# Patient Record
(demographics unavailable — no encounter records)

---

## 2024-10-10 NOTE — IMAGING
[Straightening consistent with spasm] : Straightening consistent with spasm [Disc space narrowing] : Disc space narrowing [Scoliosis] : Scoliosis [de-identified] : LSPINE Palpation: Midline lumbar tenderness. No tenderness to palpation or spasm in bilateral thoracic and lumbar paraspinal musculature, no SI joint tenderness to palpation ROM: diminished all planes Strength: 5/5 bilateral hip flexors, knee extensors, ankle dorsiflexors, EHL, ankle plantarflexors Sensation: Sensation present to light touch bilateral L2-S1 distributions Provocative maneuvers: Negative bilateral straight leg raise. Tight hamstrings bilaterally  Bilateral hips- Palpation: No tenderness to palpation over greater trochanter or IT band  difficulty with tandem walk  ambulation with cane

## 2024-10-10 NOTE — HISTORY OF PRESENT ILLNESS
[Gradual] : gradual [7] : 7 [6] : 6 [Dull/Aching] : dull/aching [Sharp] : sharp [Frequent] : frequent [Leisure] : leisure [Rest] : rest [Walking] : walking [Exercising] : exercising [Not working due to injury] : Work status: not working due to injury [de-identified] : 3/19/24-62 y/o M presents for lower back pain X a few days. Denies specific injury, but states he did a lot of walking the day before onset.  Associated radiation to posterior thighs, without N/T. Has had intermittent lower back pain X several years; states it has been worsening recently. Aggravated by prolonged walking. Has tried massage/biofreeze/hot showers, with partial relief. Has tried naproxen/gabapentin in the past, without relief. Denies prior lower back physical therapy/surgeries. Ambulation with cane for balance. Denies dexterity issues. Denies b/b dysfunction.   PMH: HTN  10/10/2024: patient is here for follow up on lower back. pt notes that he was doing well and was going to physical therapy. now recently he has been having constant pains.  [] : Post Surgical Visit: no [FreeTextEntry1] : lower back  [FreeTextEntry5] : 3/19/24: pt is here today for back pain. pt states about a few days ago, the pain has been bothering him [FreeTextEntry7] : down towards both legs

## 2024-10-10 NOTE — ASSESSMENT
[FreeTextEntry1] : lumbar DDD/possible BLE radic, although negative tension signs PT, meds  NSAIDs- Patient warned of risk of medication to GI tract, increased blood pressure, cardiac risk, and risk of fluid retention.  Advised to clear medication with internist or PCP if any concurrent health problem with heart, blood pressure, or GI system exists.  Muscle Relaxants- To help decrease muscle spasm and assist with pain relief. Advised of sedating effects and instructed not to drive, operate heavy machinery, or take with other sedating medications.  Patient has failed 6 weeks of conservative care, including physical therapy and medications. Will obtain MRI to rule out HNP; will also be used to guide potential future injections/surgical management.

## 2024-10-28 NOTE — IMAGING
[de-identified] : Patient ambulates with a Antalgic gait   Right Foot and Ankle: Inspection: Erythema: None Swelling: moderate medially Ecchymosis: None Abrasions: None Rashes: None Surgical Scars: None Effusion: None Atrophy: None Deformity: None Pes McLeod Valgus: + Pes Cavus: Negative   Palpation: Crepitus: None Proximal Fibula: Nontender Distal Fibula: Nontender Medial Malleolus: nontender Lateral Malleolus: nontender AITFL: nontender PITFL: Nontender ATFL: notender CFL: nontender Deltoid: nontender Calcaneus: Nontender Talar Head/Neck: Nontender Lateral Process of Talus: Nontender Anterior Facet of Calcaneus: Nontender Peroneal Tendons:non tender Posterior Tibialis: nontender Achilles Tendon: Nontender & Intact Achilles Insertion: Nontender Retrocalcaneal Bursa: Nontender Anterior Capsule: nontender Subtalar Joint: Nontender Talonavicular Joint: Nontender Calcaneocuboid Joint: Nontender Heel pad: Nontender Medial Tubercle of Calcaneus: Nontender Plantar Fascia: Nontender Midfoot: Nontender Forefoot: Nontender Sesamoids: Nontender  ROM: Ankle Dorsiflexion: 0 degrees Ankle Plantar Flexion: 35 degrees Eversion: 15 degrees Inversion: 25 degrees, painful, but improved  Motor: Dorsiflexion: 5 out of 5 Plantar Flexion: 5 out of 5 Inversion: 5  out of 5 Eversion: 5 out of 5, painless  Provocative Testing: Anterior Drawer: neg Syndesmosis Squeeze Test: Negative Circumduction test: Negative Resisted ER: Painless  Axial Grind 1st MTP: Painless Neurologic Exam: L4-S1 Sensation: Grossly Intact Tinels: Negative  Vascular Exam/Pulses: Dorsalis Pedis: 2+ Posterior Tibialis: 2+ Capillary Refill: <2 Seconds Other Exams: None Pertinent Contralateral Findings: None

## 2024-10-28 NOTE — DISCUSSION/SUMMARY
[Medication Risks Reviewed] : Medication risks reviewed [de-identified] : Posterior tib tendonitis in setting of flatfoot rec: mobic, Tall Camboot, activity modification RTC 3 weeks  next visit if improved --> transiton to ASO and Custom molded orthotics if no imrpovement consider longer camboot immobilization possible MR possible PRP *** Posterior tib tendonitis in setting of flatfoot, still symptomatic, was not the most compliant with Camboot rec: mobic, Tall Camboot, activity modification RTC 2 weeks  next visit if improved --> transiton to ASO and Custom molded orthotics if no imrpovement consider longer camboot immobilization possible MR possible PRP *** Posterior tib tendonitis in setting of flatfoot, more compliant with Camboot symptoms improved rec: mobic, ASO, PT, activity modification RTC 4 weeks  next visit Custom molded orthotics if no imrpovement consider possible MR possible PRP **** 10/28 Posterior tib tendonitis in setting of flatfoot rec: mobic, ASO, PT, activity modification, [place] brace RTC 2-3 months  next visit if no improvement consider possible MR possible PRP

## 2024-10-28 NOTE — DATA REVIEWED
[FreeTextEntry1] : 3 v R ankle neg for fx or dislocation mild tibiotalar arthrosis negative meary angle

## 2024-10-28 NOTE — HISTORY OF PRESENT ILLNESS
[de-identified] : Date of Injury/Onset: 05/27/2024  Pain: At Rest: 1/10 With Activity: 6/10 Mechanism of injury: N/A This is NOT a Work Related Injury being treated under Worker's Compensation. This is NOT an athletic injury occurring associated with an interscholastic or organized sports team. Quality of symptoms: Swelling/Pain Improves with: Rest; Ice Worse with: Walking Prior treatment: None Prior Imaging: None Reports Available For Review Today: None Out of work/sport: N/A School/Sport/Position/Occupation: Disability- OOW  06/05/2024 HEIDI Garza year M presenting with right ankle pain that started on 05/27/2024 w/o injury. Was active on 05/25/2024 and believes this is related. Finds relief with elevating ankle & applying ice. Ambulate w/cane. Saw Bella ; no images taken. Referred here.   07/23/2024 HEIDI Garza year M is here today to follow up on right ankle. Patient states he has been compliant with Mobic. He wears camboot while inside and when walking, though states he has not been wearing it while driving, which he does quite often. Patient reports slight improvement since last visit. Patient is ambulating with a cane. Pt was not compliant wearing CAMBOOT at all times.   08/06/2024 HEIDI Garza year M is here today to follow up on right ankle. Patient states he has been compliant with Mobic. He has been wearing cam boot more frequently. Doing much better.  10/28/2024 HEIDI  is here today to follow up on right ankle. Patient did PT, takes Mobic as needed. He had been compliant with ASO. Reports some mild improvement with pain. Patient is ambulating with a cane.

## 2024-10-29 NOTE — IMAGING
[Straightening consistent with spasm] : Straightening consistent with spasm [Disc space narrowing] : Disc space narrowing [Scoliosis] : Scoliosis [de-identified] : LSPINE Palpation: Midline lumbar tenderness. No tenderness to palpation or spasm in bilateral thoracic and lumbar paraspinal musculature, no SI joint tenderness to palpation ROM: diminished all planes Strength: 5/5 bilateral hip flexors, knee extensors, ankle dorsiflexors, EHL, ankle plantarflexors Sensation: Sensation present to light touch bilateral L2-S1 distributions Provocative maneuvers: Negative bilateral straight leg raise. Tight hamstrings bilaterally  Bilateral hips- Palpation: No tenderness to palpation over greater trochanter or IT band  difficulty with tandem walk  ambulation with cane

## 2024-10-29 NOTE — ASSESSMENT
[FreeTextEntry1] : LR and NF narrowing L3-S1  Discussed Pain and surgeries  NSAIDs- Patient warned of risk of medication to GI tract, increased blood pressure, cardiac risk, and risk of fluid retention.  Advised to clear medication with internist or PCP if any concurrent health problem with heart, blood pressure, or GI system exists.  Gabapentin- Patient advised of sedating effects, instructed not to drive, operate machinery, or take with other sedating medications. Advised of need to taper on/off medication and risk of abruptly stopping gabapentin.

## 2024-10-29 NOTE — HISTORY OF PRESENT ILLNESS
[Gradual] : gradual [7] : 7 [6] : 6 [Dull/Aching] : dull/aching [Sharp] : sharp [Frequent] : frequent [Leisure] : leisure [Rest] : rest [Walking] : walking [Exercising] : exercising [Not working due to injury] : Work status: not working due to injury [de-identified] : 10/17/2024 Lumbar MRI  - report noted in chart.  Ind. review- LR and NF narrowing L3-S1 ================================================================= 3/19/24-60 y/o M presents for lower back pain X a few days. Denies specific injury, but states he did a lot of walking the day before onset.  Associated radiation to posterior thighs, without N/T. Has had intermittent lower back pain X several years; states it has been worsening recently. Aggravated by prolonged walking. Has tried massage/biofreeze/hot showers, with partial relief. Has tried naproxen/gabapentin in the past, without relief. Denies prior lower back physical therapy/surgeries. Ambulation with cane for balance. Denies dexterity issues. Denies b/b dysfunction.   PMH: HTN  10/10/2024: patient is here for follow up on lower back. pt notes that he was doing well and was going to physical therapy. now recently he has been having constant pains.  10/29/24- MRI f/u [] : Post Surgical Visit: no [FreeTextEntry1] : lower back  [FreeTextEntry5] : 3/19/24: pt is here today for back pain. pt states about a few days ago, the pain has been bothering him [FreeTextEntry7] : down towards both legs

## 2024-11-08 NOTE — HISTORY OF PRESENT ILLNESS
[de-identified] : 11/08/2024: Pt here today for follow-up bilateral shoulders, L>R. Pain and symptoms are worsening. Since last visit pt has had about 1 week relief from CSI. C/o worsening pain after he turned to reach for something and felt his shoulder lock, about 3 weeks ago. He is concerned he has made his injury worse. He has been going to PT.  8/30/24: Follow up on the left shoulder today. Has been having some increased pain over the past couple weeks. Had CSI last visit in May, had about 2 months relief and would like to repeat today. Patient also c/o increased right shoulder pain as well over the past month. Issues with ROM and internal rotation on the right.   5/31/2024: Patient is here for follow up on left shoulder. Decreased pain w/rest. Pain when lifting. Tramadol helps pain. Pt is having pain and swelling in his right ankle.  02/28/2024: Patient is here today to follow up on his left shoulder. He reports that he has made improvement, but he still feels pain with certain movements, and he still feels discomfort when lying on his left side. He is going to PT 2x a week.   12/1/23: here to follow up left shoulder and review left shoulder mri results.   11/10/23: 60 year old male is here for an evaluation for the L shoulder. No injury occurred. Patient states he has been having pain in the L shoulder over a year ago. Patient saw  for the shoulder, got an xray done where the report stated the bone was getting close to the nerve. Patient was going to physical therapy, states PT helped with some relief, but it conflicted with his knee surgery, so he discontinued PT. Patient states pain has increased since then. Minimal relief with voltaren gel

## 2024-11-08 NOTE — DISCUSSION/SUMMARY
[de-identified] : Assessment: The patient is a 61 year old male with left shoulder and right ankle pain and physical exam findings consistent with left shoulder impingement, bursitis and biceps tendinitis and right ankle posterior tibialis dysfunction.  Patient and I discussed their symptoms. Discussed findings of today's exam and possible causes of patient's pain. Educated patient on their most probable diagnosis. Reviewed possible courses of treatment, and we collaboratively decided best course of treatment at this time will include:  1. CSI in B/L shoulder today - milana well 2. Ice frequently  The patient's current medication management of their orthopedic diagnosis was reviewed today:   (1) We discussed a comprehensive treatment plan that included possible pharmaceutical management involving the use of prescription strength medications including but not limited to options such as oral Naprosyn 500mg BID, once daily Meloxicam 15 mg, or 500-650 mg Tylenol versus over the counter oral medications and topical prescription NSAID Pennsaid vs over the counter Voltaren gel.   (2) There is a moderate risk of morbidity with further treatment, especially from use of prescription strength medications and possible side effects of these medications which include upset stomach with oral medications, skin reactions to topical medications and cardiac/renal issues with long term use.   (3) I recommended that the patient follow-up with their medical physician to discuss any significant specific potential issues with long term medication use such as interactions with current medications or with exacerbation of underlying medical comorbidities.   (4) The benefits and risks associated with use of injectable, oral or topical, prescription and over the counter anti-inflammatory medications were discussed with the patient. The patient voiced understanding of the risks including but not limited to bleeding, stroke, kidney dysfunction, heart disease, and were referred to the black box warning label for further information.  Prior to appointment and during encounter with patient extensive medical records were reviewed including but not limited to, hospital records, out patient records, imaging results, and lab data. During this appointment the patient was examined, diagnoses were discussed and explained in a face to face manner. In addition extensive time was spent reviewing aforementioned diagnostic studies. Counseling including abnormal image results, differential diagnoses, treatment options, risk and benefits, lifestyle changes, current condition, and current medications was performed. Patient's comments, questions, and concerns were address and patient verbalized understanding.  Follow up in 3 months

## 2024-11-08 NOTE — IMAGING
Progress Notes by Santhosh Quiroz PT, DPT at 05/15/17 08:00 AM     Author:  Santhosh Quiroz PT, DPT Service:  (none) Author Type:  Physical Therapist     Filed:  05/15/17 01:03 PM Encounter Date:  5/15/2017 Status:  Signed     :  Santhosh Quiroz PT, DPT (Physical Therapist)            DIAGNOSIS:   1. Status post total left knee replacement    2. Acute pain of left knee    3. Difficulty walking      INSURANCE BENEFITS: awaiting benefits    PHYSICIAN RECOMMENDATIONS: Evaluate and Treat     ATTENDANCE: Patient has been seen for[LJ1.1C] 4[LJ1.1M] visits between 5/4/2017 and[LJ1.1C]  5/15/2017[LJ1.1T].  Progress Summary due by 6/3/17.    SUBJECTIVE:[LJ1.1C]  Patient reports[LJ1.1T] she was able to do the stairs at home reciprocally. Gets the staples out tomorrow.[LJ1.1M]    OBJECTIVE:     5/10/17: increased soft tissue restrictions through the posterior left leg    Observation/Posture: staples along the left incision, no weeping, ambulating with a cane, increased swelling through the left leg     Range of Motion: right knee active range of motion 0-130 degrees, left knee active range of motion 0-8-80 degrees, passive range of motion of the left knee 0-6-90 degrees     Manual Muscle Test: Strength per manual muscle test (*=pain):   Right  5/4/2017  Left  5/4/2017    Quadriceps 5/5 4+/5   Hamstrings 5/5 5-/5       Palpation:significant soft tissue restrictions noted through the left calf, quadriceps, iliotibial band and hamstring     Joint Mobility: decreased left patella mobility into all planes of motion    Special Tests: Not tested secondary to post surgical    Functional Assessment:   Gait: wide base of support, decreased stance time on the left, lack of terminal knee extension, slight decrease in knee flexion   Stairs: not formally assessed  Sit to stand transfers: using bilateral upper extremities, more weight through the right lower extremity with left knee in extended position    TREATMENT TODAY:   Time  in:[LJ1.1C] 7:56[LJ1.1M]     Time out:[LJ1.1C]  8:46[LJ1.2M]    Manual Therapy to  increase range of motion and improve soft tissue and joint mobility:  97140 x 1 units -  18 minutes  soft tissue mobilization through the left calf, hamstring and quadriceps   Patella mobilizations into all planes of motion on left     Therapeutic Exercise to increase range of motion, improve flexibility and instruct in a home exercise program:  [id number] x 2 units -[LJ1.1C]  25[LJ1.2M] minutes  Passive/active assisted knee flexion stretching in supine x 20  Passive knee extension 10 x 5 seconds  Bike x 5 min - slow and 1/2 revolution  Heel raises x 10 each (2 ways)  Standing hip abd, ext x 10 bilateral with focus on quadriceps contraction   4\" step up[LJ1.1C] and over fwd, lat x 10 each  New:  Chair squat + 2 airex x 15  1/2 foam gait step overs in p bars forward x 5 laps, lateral x 3 laps  Tandem balance x 30\" bilateral  Single leg balance with cones x 2[LJ1.2M]    Home exercise program (last updated (5/4/2017): Patient issued written home exercise program.  Patient demonstrated exercises correctly and was instructed to call with questions.   Repeat 1-2 times per day     ASSESSMENT/TREATMENT RESPONSE:    Patient's response to treatment:[LJ1.1C] Decreased tightness to quads and posterior knee following manual therapy. Ambulates throughout the clinic without an assistive device but with decreased knee flexion and antalgic gait that improves with cues. Decent control with squats and step ups.[LJ1.2M]    Functional improvement noted:[LJ1.1C] Improved hip/knee flexion after cueing during gait training.[LJ1.2M]    Prognosis for meeting goals:  good.   Treatment will consist of home program, manual therapy, neuromuscular re-education, Physical therapy evaluation and therapeutic exercise.  Treatment plan was discussed with the patient and verbal consent was obtained.    Remaining Impairment Requiring Continued Treatment: decreased range of  motion, decreased flexibility, decreased strength, decreased balance, pain, swelling and impairment of functional performance    Short Term Goals (to be achieved in 2 weeks)  1. Patient will be independent with home exercise program. (5/8/2017 - reports compliance)  2. Patient will increase left knee active range of motion 0-2-100 degrees in order to enhance transfers.[LJ1.1C] ([LJ1.2M]5/15/2017[LJ1.3T] - working towards)[LJ1.2M]  3. Patient will be able to perform sit to stand transfer from 20 inches without upper extremities and equal weight through lower extremities x 5.   4. Patient will be able to ambulate all distances without assistive device. - through the gym, 5/10/17  5. Patient will be able to perform 4 inch step ups x 10 without pain.     Long Term Goals (to be achieved in 6 weeks)  1. Patient will have normalized patella mobility in order to enhance range of motion.  2. Patient will increase left knee strength to 5/5 in order to perform stairs step over step.   3. Patient will increase left knee active range of motion 0-115 degrees in order to enhance function.  4. Patient will have further goals set as needed.     PLAN: Return to all exercises and progress with strengthening.     Patient will be seen 2 times per week for 12 weeks.     Therapist Signature:[LJ1.1C] Electronically Signed by:    Santhosh Quiroz PT, DPT , 5/15/2017[LJ1.1T]           Revision History        User Key Date/Time User Provider Type Action    > LJ1.3 05/15/17 01:03 PM Santhosh Quiroz PT, DPT Physical Therapist Sign     LJ1.2 05/15/17 12:58 PM Santhosh Quiroz PT, DPT Physical Therapist      LJ1.1 05/15/17 08:00 AM Santhosh Quiroz PT, DPT Physical Therapist     C - Copied, M - Manual, T - Template             [de-identified] : BILATERAL Shoulder  Inspection: Scapula Winging: Negative Deformity: None Erythema: None Ecchymosis: None Abrasions: None Effusion: Mild Crepitus: Moderate   Range of Motion: Active Forward Flexion:110 degrees Passive Forward Flexion:130 degrees Active IR : back pocket Active ER :30 degrees   Motor Exam: Forward Flexion: 4+ out of 5 Flexion Plane of Scapula: 5 out of 5 Abduction: 4+ out of 5 Internal Rotation: 5 out of 5 External Rotation: 4+ out of 5 Distal Motor Strength: 5 out of 5   Stability Testing: Anterior: 1+ Posterior: 1+ Sulcus N: 1+ Sulcus ER: 1+   Provocative Tests: Drop Arm: Negative Martinez/Impingement: Positive [place]: Positive X-Arm Adduction: Negative Belly Press: Negative Bear Hug: Negative Lift Off: Negative Apprehension: Negative Relocation: Negative Posterior Load & Shift: Negative   Palpation: AC Joint: Nontender Clavicle: Nontender SC Joint: Nontender Bicepital Groove: Positive Coracoid Process: Positive Pectoralis Minor Tendon: Nontender Pectoralis Major Tendon: Nontender & palpably intact Latissimus Dorsi: Nontender Proximal Humerus: Positive Scapula Body: Nontender Medial Scapula Boarder: Nontender Scapula Spine: Nontender   Neurologic Exam: Sensation to Light Touch: Axillary: Grossly intact Ulnar: Grossly intact Radial: Grossly intact Median: Grossly intact Other:  N/A   Circulatory/Pulses: Ulnar: 2+ Radial: 2+ Other Pertinent Findings: None  Bilateral X-Ray Examination of the SHOULDER (2 views): no fractures, subluxations or dislocations. Mild Arthritis   Bilateral X-Ray Examination of the SCAPULA 1 or 2 views shows: no significant abnormalities. there is Type II acromion.

## 2024-12-13 NOTE — DISCUSSION/SUMMARY
[de-identified] : We discussed their diagnosis and treatment options at length including the risks and benefits of both surgical and non-surgical options. - In addition to discussing non-operative treatment options, we discussed that early repair in acute tears has improved functional outcomes and helps mitigate the development of chronic tendon and muscle pathology such as retraction, fatty infiltration, and atrophy. - Given their symptoms of pain and weakness along with the acute / traumatic nature of their tear, they are a surgical candidate.  - The risks, benefits, and alternatives to left shoulder surgical arthroscopy with rotator cuff repair, SAD, GHD, ** synovectomy, poss DCE,** poss biceps tenotomy vs. tenodesis were discussed with the patient, all questions were answered

## 2024-12-13 NOTE — IMAGING
[de-identified] : LEFT SHOULDER Inspection: No swelling.  Palpation: Tenderness is noted at the bicipital groove, anterior and lateral.  Range of motion: There is pain with range of motion. , ER 55, @90ER 90, @90IR 30 Strength: There is pain, weakness, and discomfort with strength testing. Forward Flexion 3/5. Abduction 3/5. External Rotation 4/5 and Internal Rotation 5-/5  Neurological testings: motor and sensor intact distally. Ligament Stability and Special Tests:  There is positive arc of pain.  Shoulder apprehension: neg Shoulder relocation: neg Obriens test: pos Biceps Active test: neg Foreman Labral Shear: neg Impingement testing: pos Thaddeus testing: pos Whipple: pos Cross Body Adduction: neg

## 2024-12-13 NOTE — DATA REVIEWED
[MRI] : MRI [Left] : left [Shoulder] : shoulder [Report was reviewed and noted in the chart] : The report was reviewed and noted in the chart [I independently reviewed and interpreted images and report] : I independently reviewed and interpreted images and report [I reviewed the films/CD] : I reviewed the films/CD [FreeTextEntry1] : [facility] 11/14/24: 1. AC joint arthrosis with lateral acromial spur. Infraspinatus tendinopathy and fraying with 6mm articular insertional tear. Supraspinatus tendinopathy and fraying with linear articular interstitial tear 10mm proximal to the insertion with traction edema and traction cyst at the humeral head and no fracture 2. Fraying and tear of the superior labrum and inferior labrum. Biceps tendinopathy with diffuse tear horizontal segment and anchor and tenosynovitis 3. Subscapularis tendinopathy and fraying with ill-defined low to moderate grade insertional tear 4. Capsular thickening more noted anterior which can be seen with adhesive capsulitis 5. Mild to mod arthrosis of the GH joint with joint effusion

## 2024-12-13 NOTE — HISTORY OF PRESENT ILLNESS
[de-identified] : 12/13/2024: Patient present today for follow-up left shoulder. Underwent MRI and is here to discuss the imaging results. Since last visit, pt has been feeling about the same.   11/08/2024: Pt here today for follow-up bilateral shoulders, L>R. Pain and symptoms are worsening. Since last visit pt has had about 1 week relief from CSI. C/o worsening pain after he turned to reach for something and felt his shoulder lock, about 3 weeks ago. He is concerned he has made his injury worse. He has been going to PT.  8/30/24: Follow up on the left shoulder today. Has been having some increased pain over the past couple weeks. Had CSI last visit in May, had about 2 months relief and would like to repeat today. Patient also c/o increased right shoulder pain as well over the past month. Issues with ROM and internal rotation on the right.   5/31/2024: Patient is here for follow up on left shoulder. Decreased pain w/rest. Pain when lifting. Tramadol helps pain. Pt is having pain and swelling in his right ankle.  02/28/2024: Patient is here today to follow up on his left shoulder. He reports that he has made improvement, but he still feels pain with certain movements, and he still feels discomfort when lying on his left side. He is going to PT 2x a week.   12/1/23: here to follow up left shoulder and review left shoulder mri results.   11/10/23: 60 year old male is here for an evaluation for the L shoulder. No injury occurred. Patient states he has been having pain in the L shoulder over a year ago. Patient saw  for the shoulder, got an xray done where the report stated the bone was getting close to the nerve. Patient was going to physical therapy, states PT helped with some relief, but it conflicted with his knee surgery, so he discontinued PT. Patient states pain has increased since then. Minimal relief with voltaren gel

## 2024-12-17 NOTE — HISTORY OF PRESENT ILLNESS
[Gradual] : gradual [7] : 7 [6] : 6 [Dull/Aching] : dull/aching [Sharp] : sharp [Frequent] : frequent [Leisure] : leisure [Rest] : rest [Walking] : walking [Exercising] : exercising [Not working due to injury] : Work status: not working due to injury [de-identified] : 10/17/2024 Lumbar MRI  - report noted in chart.  Ind. review- LR and NF narrowing L3-S1 ============================================================================================== 3/19/24-62 y/o M presents for lower back pain X a few days. Denies specific injury, but states he did a lot of walking the day before onset.  Associated radiation to posterior thighs, without N/T. Has had intermittent lower back pain X several years; states it has been worsening recently. Aggravated by prolonged walking. Has tried massage/biofreeze/hot showers, with partial relief. Has tried naproxen/gabapentin in the past, without relief. Denies prior lower back physical therapy/surgeries. Ambulation with cane for balance. Denies dexterity issues. Denies b/b dysfunction.   PMH: HTN  10/10/2024: patient is here for follow up on lower back. pt notes that he was doing well and was going to physical therapy. now recently he has been having constant pains.  10/29/24- MRI f/u 12/17/24- sxs same, would like to discuss injections. He has stopped taking gabapentin and meloxicam due to side effects.  [] : Post Surgical Visit: no [FreeTextEntry1] : lower back  [FreeTextEntry5] : 3/19/24: pt is here today for back pain. pt states about a few days ago, the pain has been bothering him [FreeTextEntry7] : down towards both legs

## 2024-12-17 NOTE — ASSESSMENT
All health maintenance and other pertinent information has been reviewed in preparation for today's office visit. Patient presents in the office today for:    Chief Complaint   Patient presents with    Back Pain     Sciatica Back Pain: Pt c/o back pain radiating down left thigh to ankke. 1. Have you been to the ER, urgent care clinic since your last visit? Hospitalized since your last visit? Yes. [facility] on 9/27/18. 2. Have you seen or consulted any other health care providers outside of the [address] [place] [address] since your last visit? Include any pap smears or colon screening.  No [FreeTextEntry1] : LR and NF narrowing L3-S1  Follow up with pain management Discussed surgeries  NSAIDs- Patient warned of risk of medication to GI tract, increased blood pressure, cardiac risk, and risk of fluid retention.  Advised to clear medication with internist or PCP if any concurrent health problem with heart, blood pressure, or GI system exists.  Gabapentin- Patient advised of sedating effects, instructed not to drive, operate machinery, or take with other sedating medications. Advised of need to taper on/off medication and risk of abruptly stopping gabapentin.

## 2024-12-17 NOTE — IMAGING
[Straightening consistent with spasm] : Straightening consistent with spasm [Disc space narrowing] : Disc space narrowing [Scoliosis] : Scoliosis [de-identified] : LSPINE Palpation: Midline lumbar tenderness. No tenderness to palpation or spasm in bilateral thoracic and lumbar paraspinal musculature, no SI joint tenderness to palpation ROM: diminished all planes Strength: 5/5 bilateral hip flexors, knee extensors, ankle dorsiflexors, EHL, ankle plantarflexors Sensation: Sensation present to light touch bilateral L2-S1 distributions Provocative maneuvers: Negative bilateral straight leg raise. Tight hamstrings bilaterally  Bilateral hips- Palpation: No tenderness to palpation over greater trochanter or IT band  difficulty with tandem walk  ambulation with cane

## 2025-02-24 NOTE — IMAGING
[de-identified] : LEFT SHOULDER Inspection: No swelling.  Palpation: Tenderness is noted at the bicipital groove, anterior and lateral.  Range of motion: There is pain with range of motion. , ER 55, @90ER 90, @90IR 30 Strength: There is pain, weakness, and discomfort with strength testing. Forward Flexion 3/5. Abduction 3/5. External Rotation 4/5 and Internal Rotation 5-/5  Neurological testings: motor and sensor intact distally. Ligament Stability and Special Tests:  There is positive arc of pain.  Shoulder apprehension: neg Shoulder relocation: neg Obriens test: pos Biceps Active test: neg Foreman Labral Shear: neg Impingement testing: pos Thaddeus testing: pos Whipple: pos Cross Body Adduction: neg

## 2025-02-24 NOTE — REASON FOR VISIT
[FreeTextEntry2] : POV #1 s/p left shoulder biceps tenotomy, RTC and labral debridement, and SAD DOS: 2/11/2025

## 2025-02-24 NOTE — DISCUSSION/SUMMARY
[de-identified] : The patient is approximately 2 weeks postoperative. S/p left shoulder biceps tenotomy, RTC and labral debridement, and SAD DOS: 2/11/2025  Sutures removed and Steri Strips applied today. The patient is instructed in wound management. The patient's post-op plan, protocol and activity modifications have been thoroughly discussed and the patient expressed understanding. The patient will control pain as discussed & continue ice and elevation as needed. The patient otherwise may advance activity as discussed.   Arthroscopy photos were reviewed in great detail.  Prescription Medications Ordered: [None]   Physical Therapy: [Start per protocol, new prescription given today, continue home exercise program]   Braces/DME Ordered: Continue Postop Sling, continue for 2 weeks then discontinue   Activity/Work/Sports Status: [Out of work]   Follow-Up: [4 weeks]

## 2025-02-24 NOTE — PHYSICAL EXAM
[de-identified] : LEFT SHOULDER Inspection: incisions c/d/i Palpation: No Tenderness is noted  Range of motion: in sling Strength:  Neurological testing: motor and sensor intact distally. Ligament Stability and Special Tests:  Shoulder apprehension:  Shoulder relocation:  Obriens test: Biceps Active test: Foreman Labral Shear:  Impingement testing:  Thaddeus testing: Cross Body Adduction:

## 2025-02-24 NOTE — PHYSICAL EXAM
[de-identified] : LEFT SHOULDER Inspection: incisions c/d/i Palpation: No Tenderness is noted  Range of motion: in sling Strength:  Neurological testing: motor and sensor intact distally. Ligament Stability and Special Tests:  Shoulder apprehension:  Shoulder relocation:  Obriens test: Biceps Active test: Foreman Labral Shear:  Impingement testing:  Thaddeus testing: Cross Body Adduction:

## 2025-02-24 NOTE — DISCUSSION/SUMMARY
[de-identified] : The patient is approximately 2 weeks postoperative. S/p left shoulder biceps tenotomy, RTC and labral debridement, and SAD DOS: 2/11/2025  Sutures removed and Steri Strips applied today. The patient is instructed in wound management. The patient's post-op plan, protocol and activity modifications have been thoroughly discussed and the patient expressed understanding. The patient will control pain as discussed & continue ice and elevation as needed. The patient otherwise may advance activity as discussed.   Arthroscopy photos were reviewed in great detail.  Prescription Medications Ordered: [None]   Physical Therapy: [Start per protocol, new prescription given today, continue home exercise program]   Braces/DME Ordered: Continue Postop Sling, continue for 2 weeks then discontinue   Activity/Work/Sports Status: [Out of work]   Follow-Up: [4 weeks]

## 2025-02-24 NOTE — HISTORY OF PRESENT ILLNESS
[de-identified] : 02/20/2025 : patient present today for POV #1 s/p left shoulder biceps tenotomy, RTC and labral debridement, and SAD DOS: 2/11/2025 Pain:     At Rest: 7-8/10 With Activity: 2-3/10 Quality Of Symptoms: aching pain Since last visit: Pt is doing well postoperatively, denies fever/chills/nausea/vomiting. He has been compliant w/ sling. Pt still c/o pain w/ sleeping.   12/13/2024: Patient present today for follow-up left shoulder. Underwent MRI and is here to discuss the imaging results. Since last visit, pt has been feeling about the same.   11/08/2024: Pt here today for follow-up bilateral shoulders, L>R. Pain and symptoms are worsening. Since last visit pt has had about 1 week relief from CSI. C/o worsening pain after he turned to reach for something and felt his shoulder lock, about 3 weeks ago. He is concerned he has made his injury worse. He has been going to PT.  8/30/24: Follow up on the left shoulder today. Has been having some increased pain over the past couple weeks. Had CSI last visit in May, had about 2 months relief and would like to repeat today. Patient also c/o increased right shoulder pain as well over the past month. Issues with ROM and internal rotation on the right.   5/31/2024: Patient is here for follow up on left shoulder. Decreased pain w/rest. Pain when lifting. Tramadol helps pain. Pt is having pain and swelling in his right ankle.  02/28/2024: Patient is here today to follow up on his left shoulder. He reports that he has made improvement, but he still feels pain with certain movements, and he still feels discomfort when lying on his left side. He is going to PT 2x a week.   12/1/23: here to follow up left shoulder and review left shoulder mri results.   11/10/23: 60 year old male is here for an evaluation for the L shoulder. No injury occurred. Patient states he has been having pain in the L shoulder over a year ago. Patient saw  for the shoulder, got an xray done where the report stated the bone was getting close to the nerve. Patient was going to physical therapy, states PT helped with some relief, but it conflicted with his knee surgery, so he discontinued PT. Patient states pain has increased since then. Minimal relief with voltaren gel

## 2025-02-24 NOTE — HISTORY OF PRESENT ILLNESS
[de-identified] : 02/20/2025 : patient present today for POV #1 s/p left shoulder biceps tenotomy, RTC and labral debridement, and SAD DOS: 2/11/2025 Pain:     At Rest: 7-8/10 With Activity: 2-3/10 Quality Of Symptoms: aching pain Since last visit: Pt is doing well postoperatively, denies fever/chills/nausea/vomiting. He has been compliant w/ sling. Pt still c/o pain w/ sleeping.   12/13/2024: Patient present today for follow-up left shoulder. Underwent MRI and is here to discuss the imaging results. Since last visit, pt has been feeling about the same.   11/08/2024: Pt here today for follow-up bilateral shoulders, L>R. Pain and symptoms are worsening. Since last visit pt has had about 1 week relief from CSI. C/o worsening pain after he turned to reach for something and felt his shoulder lock, about 3 weeks ago. He is concerned he has made his injury worse. He has been going to PT.  8/30/24: Follow up on the left shoulder today. Has been having some increased pain over the past couple weeks. Had CSI last visit in May, had about 2 months relief and would like to repeat today. Patient also c/o increased right shoulder pain as well over the past month. Issues with ROM and internal rotation on the right.   5/31/2024: Patient is here for follow up on left shoulder. Decreased pain w/rest. Pain when lifting. Tramadol helps pain. Pt is having pain and swelling in his right ankle.  02/28/2024: Patient is here today to follow up on his left shoulder. He reports that he has made improvement, but he still feels pain with certain movements, and he still feels discomfort when lying on his left side. He is going to PT 2x a week.   12/1/23: here to follow up left shoulder and review left shoulder mri results.   11/10/23: 60 year old male is here for an evaluation for the L shoulder. No injury occurred. Patient states he has been having pain in the L shoulder over a year ago. Patient saw  for the shoulder, got an xray done where the report stated the bone was getting close to the nerve. Patient was going to physical therapy, states PT helped with some relief, but it conflicted with his knee surgery, so he discontinued PT. Patient states pain has increased since then. Minimal relief with voltaren gel

## 2025-02-24 NOTE — IMAGING
[de-identified] : LEFT SHOULDER Inspection: No swelling.  Palpation: Tenderness is noted at the bicipital groove, anterior and lateral.  Range of motion: There is pain with range of motion. , ER 55, @90ER 90, @90IR 30 Strength: There is pain, weakness, and discomfort with strength testing. Forward Flexion 3/5. Abduction 3/5. External Rotation 4/5 and Internal Rotation 5-/5  Neurological testings: motor and sensor intact distally. Ligament Stability and Special Tests:  There is positive arc of pain.  Shoulder apprehension: neg Shoulder relocation: neg Obriens test: pos Biceps Active test: neg Foreman Labral Shear: neg Impingement testing: pos Thaddeus testing: pos Whipple: pos Cross Body Adduction: neg

## 2025-03-29 NOTE — HISTORY OF PRESENT ILLNESS
[de-identified] : 03/20/2025 : patient present today for POV #2 s/p left shoulder biceps tenotomy, RTC and labral debridement, and SAD DOS: 2/11/2025 Pain:     At Rest: 7-8/10 With Activity: 7-8/10 Quality Of Symptoms: Aching pain, throbbing pain Since last visit: Patient has been doing PT 2x/wk @ [facility] in [place]. States he wakes up with a lot of pain, sometimes the pain is so bad it will wake him up in the middle of the night. States he takes the Tramodol for the pain with great relief.   02/20/2025 : patient present today for POV #1 s/p left shoulder biceps tenotomy, RTC and labral debridement, and SAD DOS: 2/11/2025 Pain:     At Rest: 7-8/10 With Activity: 2-3/10 Quality Of Symptoms: aching pain Since last visit: Pt is doing well postoperatively, denies fever/chills/nausea/vomiting. He has been compliant w/ sling. Pt still c/o pain w/ sleeping.   12/13/2024: Patient present today for follow-up left shoulder. Underwent MRI and is here to discuss the imaging results. Since last visit, pt has been feeling about the same.   11/08/2024: Pt here today for follow-up bilateral shoulders, L>R. Pain and symptoms are worsening. Since last visit pt has had about 1 week relief from CSI. C/o worsening pain after he turned to reach for something and felt his shoulder lock, about 3 weeks ago. He is concerned he has made his injury worse. He has been going to PT.  8/30/24: Follow up on the left shoulder today. Has been having some increased pain over the past couple weeks. Had CSI last visit in May, had about 2 months relief and would like to repeat today. Patient also c/o increased right shoulder pain as well over the past month. Issues with ROM and internal rotation on the right.   5/31/2024: Patient is here for follow up on left shoulder. Decreased pain w/rest. Pain when lifting. Tramadol helps pain. Pt is having pain and swelling in his right ankle.  02/28/2024: Patient is here today to follow up on his left shoulder. He reports that he has made improvement, but he still feels pain with certain movements, and he still feels discomfort when lying on his left side. He is going to PT 2x a week.   12/1/23: here to follow up left shoulder and review left shoulder mri results.   11/10/23: 60 year old male is here for an evaluation for the L shoulder. No injury occurred. Patient states he has been having pain in the L shoulder over a year ago. Patient saw  for the shoulder, got an xray done where the report stated the bone was getting close to the nerve. Patient was going to physical therapy, states PT helped with some relief, but it conflicted with his knee surgery, so he discontinued PT. Patient states pain has increased since then. Minimal relief with voltaren gel

## 2025-03-29 NOTE — HISTORY OF PRESENT ILLNESS
[de-identified] : 03/20/2025 : patient present today for POV #2 s/p left shoulder biceps tenotomy, RTC and labral debridement, and SAD DOS: 2/11/2025 Pain:     At Rest: 7-8/10 With Activity: 7-8/10 Quality Of Symptoms: Aching pain, throbbing pain Since last visit: Patient has been doing PT 2x/wk @ [facility] in [place]. States he wakes up with a lot of pain, sometimes the pain is so bad it will wake him up in the middle of the night. States he takes the Tramodol for the pain with great relief.   02/20/2025 : patient present today for POV #1 s/p left shoulder biceps tenotomy, RTC and labral debridement, and SAD DOS: 2/11/2025 Pain:     At Rest: 7-8/10 With Activity: 2-3/10 Quality Of Symptoms: aching pain Since last visit: Pt is doing well postoperatively, denies fever/chills/nausea/vomiting. He has been compliant w/ sling. Pt still c/o pain w/ sleeping.   12/13/2024: Patient present today for follow-up left shoulder. Underwent MRI and is here to discuss the imaging results. Since last visit, pt has been feeling about the same.   11/08/2024: Pt here today for follow-up bilateral shoulders, L>R. Pain and symptoms are worsening. Since last visit pt has had about 1 week relief from CSI. C/o worsening pain after he turned to reach for something and felt his shoulder lock, about 3 weeks ago. He is concerned he has made his injury worse. He has been going to PT.  8/30/24: Follow up on the left shoulder today. Has been having some increased pain over the past couple weeks. Had CSI last visit in May, had about 2 months relief and would like to repeat today. Patient also c/o increased right shoulder pain as well over the past month. Issues with ROM and internal rotation on the right.   5/31/2024: Patient is here for follow up on left shoulder. Decreased pain w/rest. Pain when lifting. Tramadol helps pain. Pt is having pain and swelling in his right ankle.  02/28/2024: Patient is here today to follow up on his left shoulder. He reports that he has made improvement, but he still feels pain with certain movements, and he still feels discomfort when lying on his left side. He is going to PT 2x a week.   12/1/23: here to follow up left shoulder and review left shoulder mri results.   11/10/23: 60 year old male is here for an evaluation for the L shoulder. No injury occurred. Patient states he has been having pain in the L shoulder over a year ago. Patient saw  for the shoulder, got an xray done where the report stated the bone was getting close to the nerve. Patient was going to physical therapy, states PT helped with some relief, but it conflicted with his knee surgery, so he discontinued PT. Patient states pain has increased since then. Minimal relief with voltaren gel

## 2025-03-29 NOTE — HISTORY OF PRESENT ILLNESS
[de-identified] : 03/20/2025 : patient present today for POV #2 s/p left shoulder biceps tenotomy, RTC and labral debridement, and SAD DOS: 2/11/2025 Pain:     At Rest: 7-8/10 With Activity: 7-8/10 Quality Of Symptoms: Aching pain, throbbing pain Since last visit: Patient has been doing PT 2x/wk @ [facility] in [place]. States he wakes up with a lot of pain, sometimes the pain is so bad it will wake him up in the middle of the night. States he takes the Tramodol for the pain with great relief.   02/20/2025 : patient present today for POV #1 s/p left shoulder biceps tenotomy, RTC and labral debridement, and SAD DOS: 2/11/2025 Pain:     At Rest: 7-8/10 With Activity: 2-3/10 Quality Of Symptoms: aching pain Since last visit: Pt is doing well postoperatively, denies fever/chills/nausea/vomiting. He has been compliant w/ sling. Pt still c/o pain w/ sleeping.   12/13/2024: Patient present today for follow-up left shoulder. Underwent MRI and is here to discuss the imaging results. Since last visit, pt has been feeling about the same.   11/08/2024: Pt here today for follow-up bilateral shoulders, L>R. Pain and symptoms are worsening. Since last visit pt has had about 1 week relief from CSI. C/o worsening pain after he turned to reach for something and felt his shoulder lock, about 3 weeks ago. He is concerned he has made his injury worse. He has been going to PT.  8/30/24: Follow up on the left shoulder today. Has been having some increased pain over the past couple weeks. Had CSI last visit in May, had about 2 months relief and would like to repeat today. Patient also c/o increased right shoulder pain as well over the past month. Issues with ROM and internal rotation on the right.   5/31/2024: Patient is here for follow up on left shoulder. Decreased pain w/rest. Pain when lifting. Tramadol helps pain. Pt is having pain and swelling in his right ankle.  02/28/2024: Patient is here today to follow up on his left shoulder. He reports that he has made improvement, but he still feels pain with certain movements, and he still feels discomfort when lying on his left side. He is going to PT 2x a week.   12/1/23: here to follow up left shoulder and review left shoulder mri results.   11/10/23: 60 year old male is here for an evaluation for the L shoulder. No injury occurred. Patient states he has been having pain in the L shoulder over a year ago. Patient saw  for the shoulder, got an xray done where the report stated the bone was getting close to the nerve. Patient was going to physical therapy, states PT helped with some relief, but it conflicted with his knee surgery, so he discontinued PT. Patient states pain has increased since then. Minimal relief with voltaren gel

## 2025-03-29 NOTE — DISCUSSION/SUMMARY
[de-identified] : The patient is approximately 5 weeks postoperative. S/p left shoulder biceps tenotomy, RTC and labral debridement, and SAD DOS: 2/11/2025  Incision(s) appear to be healing well. The patient is instructed in wound management. The patient's post-op plan, protocol and activity modifications have been thoroughly discussed and the patient expressed understanding. The patient will control pain as discussed & continue ice and elevation as needed. The patient otherwise may advance activity as discussed.  Prescription Medications Ordered: [None]   Physical Therapy: [Continue per protocol, new prescription given today, continue home exercise program]   Braces/DME Ordered: [No brace needed any longer]   Activity/Work/Sports Status: Continue out of work   Follow-Up: [6 weeks]

## 2025-03-29 NOTE — PHYSICAL EXAM
[de-identified] : LEFT SHOULDER Inspection: incisions c/d/i Palpation: No Tenderness is noted  Range of motion: 150/45/L5 Strength: deferred due to post op stats Neurological testing: motor and sensor intact distally. Ligament Stability and Special Tests:  Shoulder apprehension:  Shoulder relocation:  Obriens test: Biceps Active test: Foreman Labral Shear:  Impingement testing:  Thaddeus testing: Cross Body Adduction:

## 2025-03-29 NOTE — DISCUSSION/SUMMARY
[de-identified] : The patient is approximately 5 weeks postoperative. S/p left shoulder biceps tenotomy, RTC and labral debridement, and SAD DOS: 2/11/2025  Incision(s) appear to be healing well. The patient is instructed in wound management. The patient's post-op plan, protocol and activity modifications have been thoroughly discussed and the patient expressed understanding. The patient will control pain as discussed & continue ice and elevation as needed. The patient otherwise may advance activity as discussed.  Prescription Medications Ordered: [None]   Physical Therapy: [Continue per protocol, new prescription given today, continue home exercise program]   Braces/DME Ordered: [No brace needed any longer]   Activity/Work/Sports Status: Continue out of work   Follow-Up: [6 weeks]

## 2025-03-29 NOTE — HISTORY OF PRESENT ILLNESS
[de-identified] : 03/20/2025 : patient present today for POV #2 s/p left shoulder biceps tenotomy, RTC and labral debridement, and SAD DOS: 2/11/2025 Pain:     At Rest: 7-8/10 With Activity: 7-8/10 Quality Of Symptoms: Aching pain, throbbing pain Since last visit: Patient has been doing PT 2x/wk @ [facility] in [place]. States he wakes up with a lot of pain, sometimes the pain is so bad it will wake him up in the middle of the night. States he takes the Tramodol for the pain with great relief.   02/20/2025 : patient present today for POV #1 s/p left shoulder biceps tenotomy, RTC and labral debridement, and SAD DOS: 2/11/2025 Pain:     At Rest: 7-8/10 With Activity: 2-3/10 Quality Of Symptoms: aching pain Since last visit: Pt is doing well postoperatively, denies fever/chills/nausea/vomiting. He has been compliant w/ sling. Pt still c/o pain w/ sleeping.   12/13/2024: Patient present today for follow-up left shoulder. Underwent MRI and is here to discuss the imaging results. Since last visit, pt has been feeling about the same.   11/08/2024: Pt here today for follow-up bilateral shoulders, L>R. Pain and symptoms are worsening. Since last visit pt has had about 1 week relief from CSI. C/o worsening pain after he turned to reach for something and felt his shoulder lock, about 3 weeks ago. He is concerned he has made his injury worse. He has been going to PT.  8/30/24: Follow up on the left shoulder today. Has been having some increased pain over the past couple weeks. Had CSI last visit in May, had about 2 months relief and would like to repeat today. Patient also c/o increased right shoulder pain as well over the past month. Issues with ROM and internal rotation on the right.   5/31/2024: Patient is here for follow up on left shoulder. Decreased pain w/rest. Pain when lifting. Tramadol helps pain. Pt is having pain and swelling in his right ankle.  02/28/2024: Patient is here today to follow up on his left shoulder. He reports that he has made improvement, but he still feels pain with certain movements, and he still feels discomfort when lying on his left side. He is going to PT 2x a week.   12/1/23: here to follow up left shoulder and review left shoulder mri results.   11/10/23: 60 year old male is here for an evaluation for the L shoulder. No injury occurred. Patient states he has been having pain in the L shoulder over a year ago. Patient saw  for the shoulder, got an xray done where the report stated the bone was getting close to the nerve. Patient was going to physical therapy, states PT helped with some relief, but it conflicted with his knee surgery, so he discontinued PT. Patient states pain has increased since then. Minimal relief with voltaren gel

## 2025-03-29 NOTE — PHYSICAL EXAM
[de-identified] : LEFT SHOULDER Inspection: incisions c/d/i Palpation: No Tenderness is noted  Range of motion: 150/45/L5 Strength: deferred due to post op stats Neurological testing: motor and sensor intact distally. Ligament Stability and Special Tests:  Shoulder apprehension:  Shoulder relocation:  Obriens test: Biceps Active test: Foreman Labral Shear:  Impingement testing:  Thaddeus testing: Cross Body Adduction:

## 2025-03-29 NOTE — DISCUSSION/SUMMARY
[de-identified] : The patient is approximately 5 weeks postoperative. S/p left shoulder biceps tenotomy, RTC and labral debridement, and SAD DOS: 2/11/2025  Incision(s) appear to be healing well. The patient is instructed in wound management. The patient's post-op plan, protocol and activity modifications have been thoroughly discussed and the patient expressed understanding. The patient will control pain as discussed & continue ice and elevation as needed. The patient otherwise may advance activity as discussed.  Prescription Medications Ordered: [None]   Physical Therapy: [Continue per protocol, new prescription given today, continue home exercise program]   Braces/DME Ordered: [No brace needed any longer]   Activity/Work/Sports Status: Continue out of work   Follow-Up: [6 weeks]

## 2025-03-29 NOTE — PHYSICAL EXAM
[de-identified] : LEFT SHOULDER Inspection: incisions c/d/i Palpation: No Tenderness is noted  Range of motion: 150/45/L5 Strength: deferred due to post op stats Neurological testing: motor and sensor intact distally. Ligament Stability and Special Tests:  Shoulder apprehension:  Shoulder relocation:  Obriens test: Biceps Active test: Foreman Labral Shear:  Impingement testing:  Thaddeus testing: Cross Body Adduction:

## 2025-03-29 NOTE — PHYSICAL EXAM
[de-identified] : LEFT SHOULDER Inspection: incisions c/d/i Palpation: No Tenderness is noted  Range of motion: 150/45/L5 Strength: deferred due to post op stats Neurological testing: motor and sensor intact distally. Ligament Stability and Special Tests:  Shoulder apprehension:  Shoulder relocation:  Obriens test: Biceps Active test: Foreman Labral Shear:  Impingement testing:  Thaddeus testing: Cross Body Adduction:

## 2025-03-29 NOTE — DISCUSSION/SUMMARY
[de-identified] : The patient is approximately 5 weeks postoperative. S/p left shoulder biceps tenotomy, RTC and labral debridement, and SAD DOS: 2/11/2025  Incision(s) appear to be healing well. The patient is instructed in wound management. The patient's post-op plan, protocol and activity modifications have been thoroughly discussed and the patient expressed understanding. The patient will control pain as discussed & continue ice and elevation as needed. The patient otherwise may advance activity as discussed.  Prescription Medications Ordered: [None]   Physical Therapy: [Continue per protocol, new prescription given today, continue home exercise program]   Braces/DME Ordered: [No brace needed any longer]   Activity/Work/Sports Status: Continue out of work   Follow-Up: [6 weeks]

## 2025-03-29 NOTE — REASON FOR VISIT
[FreeTextEntry2] : POV #2 s/p left shoulder biceps tenotomy, RTC and labral debridement, and SAD DOS: 2/11/2025

## 2025-04-29 NOTE — HISTORY OF PRESENT ILLNESS
[de-identified] : 04/28/2025: Patient present today for POV #3 s/p left shoulder biceps tenotomy, RTC and labral debridement, and SAD DOS: 2/11/2025 Quality Of Symptoms: aching anterior should pain, intermittent sharp  Since last visit: Pt states about 10 days ago was his last PT session, while waiting for his script renewal he was doing HEP, but now c/o worsening anterior shoulder pain. He is suppose to return to formal PT wednesday of this week but present for eval today due to concern. He has been taking tramadol requesting renewal   03/20/2025 : patient present today for POV #2 s/p left shoulder biceps tenotomy, RTC and labral debridement, and SAD DOS: 2/11/2025 Pain:     At Rest: 7-8/10 With Activity: 7-8/10 Quality Of Symptoms: Aching pain, throbbing pain Since last visit: Patient has been doing PT 2x/wk @ [facility] in [place]. States he wakes up with a lot of pain, sometimes the pain is so bad it will wake him up in the middle of the night. States he takes the Tramodol for the pain with great relief.   02/20/2025 : patient present today for POV #1 s/p left shoulder biceps tenotomy, RTC and labral debridement, and SAD DOS: 2/11/2025 Pain:     At Rest: 7-8/10 With Activity: 2-3/10 Quality Of Symptoms: aching pain Since last visit: Pt is doing well postoperatively, denies fever/chills/nausea/vomiting. He has been compliant w/ sling. Pt still c/o pain w/ sleeping.   12/13/2024: Patient present today for follow-up left shoulder. Underwent MRI and is here to discuss the imaging results. Since last visit, pt has been feeling about the same.   11/08/2024: Pt here today for follow-up bilateral shoulders, L>R. Pain and symptoms are worsening. Since last visit pt has had about 1 week relief from CSI. C/o worsening pain after he turned to reach for something and felt his shoulder lock, about 3 weeks ago. He is concerned he has made his injury worse. He has been going to PT.  8/30/24: Follow up on the left shoulder today. Has been having some increased pain over the past couple weeks. Had CSI last visit in May, had about 2 months relief and would like to repeat today. Patient also c/o increased right shoulder pain as well over the past month. Issues with ROM and internal rotation on the right.   5/31/2024: Patient is here for follow up on left shoulder. Decreased pain w/rest. Pain when lifting. Tramadol helps pain. Pt is having pain and swelling in his right ankle.  02/28/2024: Patient is here today to follow up on his left shoulder. He reports that he has made improvement, but he still feels pain with certain movements, and he still feels discomfort when lying on his left side. He is going to PT 2x a week.   12/1/23: here to follow up left shoulder and review left shoulder mri results.   11/10/23: 60 year old male is here for an evaluation for the L shoulder. No injury occurred. Patient states he has been having pain in the L shoulder over a year ago. Patient saw  for the shoulder, got an xray done where the report stated the bone was getting close to the nerve. Patient was going to physical therapy, states PT helped with some relief, but it conflicted with his knee surgery, so he discontinued PT. Patient states pain has increased since then. Minimal relief with voltaren gel

## 2025-04-29 NOTE — REASON FOR VISIT
[FreeTextEntry2] : POV #3 s/p left shoulder biceps tenotomy, RTC and labral debridement, and SAD DOS: 2/11/2025

## 2025-04-29 NOTE — PHYSICAL EXAM
[de-identified] : LEFT SHOULDER Inspection: incisions c/d/i Palpation: No Tenderness is noted  Range of motion: 150/45/L5 Strength: deferred due to post op stats Neurological testing: motor and sensor intact distally. Ligament Stability and Special Tests:  Shoulder apprehension:  Shoulder relocation:  Obriens test: Biceps Active test: Foreman Labral Shear:  Impingement testing:  Thaddeus testing: Cross Body Adduction:

## 2025-04-29 NOTE — HISTORY OF PRESENT ILLNESS
[de-identified] : 04/28/2025: Patient present today for POV #3 s/p left shoulder biceps tenotomy, RTC and labral debridement, and SAD DOS: 2/11/2025 Quality Of Symptoms: aching anterior should pain, intermittent sharp  Since last visit: Pt states about 10 days ago was his last PT session, while waiting for his script renewal he was doing HEP, but now c/o worsening anterior shoulder pain. He is suppose to return to formal PT wednesday of this week but present for eval today due to concern. He has been taking tramadol requesting renewal   03/20/2025 : patient present today for POV #2 s/p left shoulder biceps tenotomy, RTC and labral debridement, and SAD DOS: 2/11/2025 Pain:     At Rest: 7-8/10 With Activity: 7-8/10 Quality Of Symptoms: Aching pain, throbbing pain Since last visit: Patient has been doing PT 2x/wk @ [facility] in [place]. States he wakes up with a lot of pain, sometimes the pain is so bad it will wake him up in the middle of the night. States he takes the Tramodol for the pain with great relief.   02/20/2025 : patient present today for POV #1 s/p left shoulder biceps tenotomy, RTC and labral debridement, and SAD DOS: 2/11/2025 Pain:     At Rest: 7-8/10 With Activity: 2-3/10 Quality Of Symptoms: aching pain Since last visit: Pt is doing well postoperatively, denies fever/chills/nausea/vomiting. He has been compliant w/ sling. Pt still c/o pain w/ sleeping.   12/13/2024: Patient present today for follow-up left shoulder. Underwent MRI and is here to discuss the imaging results. Since last visit, pt has been feeling about the same.   11/08/2024: Pt here today for follow-up bilateral shoulders, L>R. Pain and symptoms are worsening. Since last visit pt has had about 1 week relief from CSI. C/o worsening pain after he turned to reach for something and felt his shoulder lock, about 3 weeks ago. He is concerned he has made his injury worse. He has been going to PT.  8/30/24: Follow up on the left shoulder today. Has been having some increased pain over the past couple weeks. Had CSI last visit in May, had about 2 months relief and would like to repeat today. Patient also c/o increased right shoulder pain as well over the past month. Issues with ROM and internal rotation on the right.   5/31/2024: Patient is here for follow up on left shoulder. Decreased pain w/rest. Pain when lifting. Tramadol helps pain. Pt is having pain and swelling in his right ankle.  02/28/2024: Patient is here today to follow up on his left shoulder. He reports that he has made improvement, but he still feels pain with certain movements, and he still feels discomfort when lying on his left side. He is going to PT 2x a week.   12/1/23: here to follow up left shoulder and review left shoulder mri results.   11/10/23: 60 year old male is here for an evaluation for the L shoulder. No injury occurred. Patient states he has been having pain in the L shoulder over a year ago. Patient saw  for the shoulder, got an xray done where the report stated the bone was getting close to the nerve. Patient was going to physical therapy, states PT helped with some relief, but it conflicted with his knee surgery, so he discontinued PT. Patient states pain has increased since then. Minimal relief with voltaren gel

## 2025-04-29 NOTE — PHYSICAL EXAM
[de-identified] : LEFT SHOULDER Inspection: incisions c/d/i Palpation: No Tenderness is noted  Range of motion: 150/45/L5 Strength: deferred due to post op stats Neurological testing: motor and sensor intact distally. Ligament Stability and Special Tests:  Shoulder apprehension:  Shoulder relocation:  Obriens test: Biceps Active test: Foreman Labral Shear:  Impingement testing:  Thaddeus testing: Cross Body Adduction:

## 2025-04-29 NOTE — DISCUSSION/SUMMARY
[de-identified] : The patient is approximately 11 weeks postoperative. S/p left shoulder biceps tenotomy, RTC and labral debridement, and SAD DOS: 2/11/2025  Incision(s) appear to be healing well. The patient is instructed in wound management. The patient's post-op plan, protocol and activity modifications have been thoroughly discussed and the patient expressed understanding. The patient will control pain as discussed & continue ice and elevation as needed. The patient otherwise may advance activity as discussed.  Prescription Medications Ordered: [medrol and tramadol ]   Physical Therapy: [Continue per protocol, new prescription given today, continue home exercise program]   Braces/DME Ordered: [No brace needed any longer]   Activity/Work/Sports Status: Continue out of work   Follow-Up: [8  weeks if pain persists would consider csi vs mri

## 2025-04-29 NOTE — DISCUSSION/SUMMARY
[de-identified] : The patient is approximately 11 weeks postoperative. S/p left shoulder biceps tenotomy, RTC and labral debridement, and SAD DOS: 2/11/2025  Incision(s) appear to be healing well. The patient is instructed in wound management. The patient's post-op plan, protocol and activity modifications have been thoroughly discussed and the patient expressed understanding. The patient will control pain as discussed & continue ice and elevation as needed. The patient otherwise may advance activity as discussed.  Prescription Medications Ordered: [medrol and tramadol ]   Physical Therapy: [Continue per protocol, new prescription given today, continue home exercise program]   Braces/DME Ordered: [No brace needed any longer]   Activity/Work/Sports Status: Continue out of work   Follow-Up: [8  weeks if pain persists would consider csi vs mri

## 2025-05-21 NOTE — HISTORY OF PRESENT ILLNESS
[FreeTextEntry1] : 62 year M with PMHx significant for presents for initial evaluation regarding their low back pain.   Current treatment:   Location: Quality: Exacerbating Factors: Alleviating Factors: Numbness/tingling: Weakness:   Bowel/bladder dysfunction: Denies   Prior injections:   Prior Treatments:   Pertinent Surgical History:   Anticoagulation:     Patient denies current infection, fevers, or chills. Physician Disclaimer: I have personally reviewed and confirmed all HPI data with the patient.

## 2025-05-21 NOTE — DATA REVIEWED
[FreeTextEntry1] :  MRI images personally reviewed and reviewed with patient. L/S MRI OC 10/2024: Lumbar spinal stenosis most prominent at L3-4 and L4-5 with the subarticular recess zones. Negative for acute fracture. No intradural lesion.

## 2025-05-23 NOTE — HISTORY OF PRESENT ILLNESS
[Lower back] : lower back [9] : 9 [3] : 3 [Sharp] : sharp [Constant] : constant [Leisure] : leisure [Sleep] : sleep [Social interactions] : social interactions [Meds] : meds [Ice] : ice [Heat] : heat [Walking] : walking [FreeTextEntry1] : 62 year M presents for initial evaluation regarding their low back pain.  Pain for several years, saw Dr. Mistry.   Current treatment: Tramadol   Location: Bilateral lower back into the left posterior thigh Numbness/tingling: Denies Weakness: Denies   Bowel/bladder dysfunction: Denies   Prior injections: Denies   Prior Treatments: PT, Tramadol, Codeine, NSAIDs, Flexeril, Gabapentin   Pertinent Surgical History: Denies   Anticoagulation: Denies     Patient denies current infection, fevers, or chills. Physician Disclaimer: I have personally reviewed and confirmed all HPI data with the patient. [] : no [FreeTextEntry7] : left thigh sometimes  [de-identified] : waking up in the morning

## 2025-05-23 NOTE — PHYSICAL EXAM
[de-identified] : General: Appears well nourished and well developed, no acute distress Musculoskeletal: Gait is non-antalgic, patient is ambulating without assistance Lumbar Spine Exam:                       Inspection:    erythema (-)   ecchymosis (-)   rashes (-)                         Palpation: Palpation/percussion at the midline lumbar levels reveals no tenderness                      ROM:    ROM - full range of motion with mild stiffness                           Strength Testin/5 in the bilateral lower extremities                      Reflexes: 2/2 in the bilateral lower extremities                      Sensation: Sensation to light touch grossly intact in the bilateral lower extremities                      Special Tests:  SLR: R (-) ; L (+), Facet loading: R (-) ; L (-)

## 2025-05-23 NOTE — ASSESSMENT
[FreeTextEntry1] : 62 year M presents with back and leg pain.  Lumbar radiculitis, attempt GAYLA.  A discussion regarding available pain management treatment options occurred with the patient.  These included interventional, rehabilitative, pharmacological, and alternative modalities. We will proceed with the following:   Interventional treatment options: - Proceed with Left L4-5, L5-S1 TFESI with fluoroscopic guidance - If inadequate relief would likely consider ILESI - see additional instructions below      Rehabilitative options: - participation in active HEP was discussed and instructions provided   Medication based treatment options: - None   Complementary treatment options: - Weight management and lifestyle modifications discussed    Additional treatment recommendations as follows: - patient to follow up with Dr. Mistry - [facility] after GAYLA  Patient is presenting with acute/sub-acute pain with impairment in ADLs and functionality. The pain has not responded to at least 6 weeks of conservative care including NSAID therapy, OTC analgesics and/or physical therapy and/or home exercise program within the last 3 months.  The risks, benefits and alternatives of the proposed procedure were explained in detail with the patient. The risks outlined include but are not limited to infection, bleeding, post- dural puncture headache, nerve injury, a temporary increase in pain, failure to resolve symptoms, need for future interventions, allergic reaction, and possible elevation of blood sugar in diabetics if using corticosteroid.  All questions were answered to patient's apparent satisfaction, and he/she verbalized an understanding.

## 2025-05-23 NOTE — PHYSICAL EXAM
[de-identified] : General: Appears well nourished and well developed, no acute distress Musculoskeletal: Gait is non-antalgic, patient is ambulating without assistance Lumbar Spine Exam:                       Inspection:    erythema (-)   ecchymosis (-)   rashes (-)                         Palpation: Palpation/percussion at the midline lumbar levels reveals no tenderness                      ROM:    ROM - full range of motion with mild stiffness                           Strength Testin/5 in the bilateral lower extremities                      Reflexes: 2/2 in the bilateral lower extremities                      Sensation: Sensation to light touch grossly intact in the bilateral lower extremities                      Special Tests:  SLR: R (-) ; L (+), Facet loading: R (-) ; L (-)

## 2025-05-23 NOTE — HISTORY OF PRESENT ILLNESS
[Lower back] : lower back [9] : 9 [3] : 3 [Sharp] : sharp [Constant] : constant [Leisure] : leisure [Sleep] : sleep [Social interactions] : social interactions [Meds] : meds [Ice] : ice [Heat] : heat [Walking] : walking [FreeTextEntry1] : 62 year M presents for initial evaluation regarding their low back pain.  Pain for several years, saw Dr. Mistry.   Current treatment: Tramadol   Location: Bilateral lower back into the left posterior thigh Numbness/tingling: Denies Weakness: Denies   Bowel/bladder dysfunction: Denies   Prior injections: Denies   Prior Treatments: PT, Tramadol, Codeine, NSAIDs, Flexeril, Gabapentin   Pertinent Surgical History: Denies   Anticoagulation: Denies     Patient denies current infection, fevers, or chills. Physician Disclaimer: I have personally reviewed and confirmed all HPI data with the patient. [] : no [FreeTextEntry7] : left thigh sometimes  [de-identified] : waking up in the morning

## 2025-06-02 NOTE — PHYSICAL EXAM
[de-identified] : LEFT SHOULDER Inspection: well healed surg scars Palpation: No Tenderness is noted  Range of motion: 150/45/L5 Strength: 4+/5 limited by pain Neurological testing: motor and sensor intact distally. Ligament Stability and Special Tests:  Shoulder apprehension:  Shoulder relocation:  Obriens test: Biceps Active test: Foreman Labral Shear:  Impingement testing:  Thaddeus testing: Cross Body Adduction:

## 2025-06-02 NOTE — DISCUSSION/SUMMARY
[de-identified] : Patient is a 62 year y/o male about 3 months S/p left shoulder biceps tenotomy, RTC and labral debridement, and SAD DOS: 2/11/2025   The patient's post-op plan, protocol and activity modifications have been thoroughly discussed and the patient expressed understanding. The patient will control pain as discussed & continue ice and elevation as needed. The patient otherwise may advance activity as discussed.  - Incisions appear well healed. - Hold off on PT until after MRI  - Patient may discontinue use of brace. - Left Shoulder MRI referral eval for new RTC tear  - Tramadol rx provided  Follow up: with MRI

## 2025-06-02 NOTE — REASON FOR VISIT
[FreeTextEntry2] : Follow up: s/p left shoulder biceps tenotomy, RTC and labral debridement, and SAD DOS: 2/11/2025

## 2025-06-02 NOTE — HISTORY OF PRESENT ILLNESS
[de-identified] : 06/02/2025: Pt here today for follow-up s/p left shoulder biceps tenotomy, RTC and labral debridement, and SAD on 2/11/2025. Pain and symptoms are worsening. Since last visit pt has been out of PT. He continues to take tramadol w/ little relief. Pt does states MDP was helpful w/ pain relief, but only lasted about a week. Today c/o persistent pain w/ lifting.  04/28/2025: Patient present today for POV #3 s/p left shoulder biceps tenotomy, RTC and labral debridement, and SAD DOS: 2/11/2025 Quality Of Symptoms: aching anterior should pain, intermittent sharp  Since last visit: Pt states about 10 days ago was his last PT session, while waiting for his script renewal he was doing HEP, but now c/o worsening anterior shoulder pain. He is suppose to return to formal PT wednesday of this week but present for eval today due to concern. He has been taking tramadol requesting renewal   03/20/2025 : patient present today for POV #2 s/p left shoulder biceps tenotomy, RTC and labral debridement, and SAD DOS: 2/11/2025 Pain:     At Rest: 7-8/10 With Activity: 7-8/10 Quality Of Symptoms: Aching pain, throbbing pain Since last visit: Patient has been doing PT 2x/wk @ [facility] in [place]. States he wakes up with a lot of pain, sometimes the pain is so bad it will wake him up in the middle of the night. States he takes the Tramodol for the pain with great relief.   02/20/2025 : patient present today for POV #1 s/p left shoulder biceps tenotomy, RTC and labral debridement, and SAD DOS: 2/11/2025 Pain:     At Rest: 7-8/10 With Activity: 2-3/10 Quality Of Symptoms: aching pain Since last visit: Pt is doing well postoperatively, denies fever/chills/nausea/vomiting. He has been compliant w/ sling. Pt still c/o pain w/ sleeping.   12/13/2024: Patient present today for follow-up left shoulder. Underwent MRI and is here to discuss the imaging results. Since last visit, pt has been feeling about the same.   11/08/2024: Pt here today for follow-up bilateral shoulders, L>R. Pain and symptoms are worsening. Since last visit pt has had about 1 week relief from CSI. C/o worsening pain after he turned to reach for something and felt his shoulder lock, about 3 weeks ago. He is concerned he has made his injury worse. He has been going to PT.  8/30/24: Follow up on the left shoulder today. Has been having some increased pain over the past couple weeks. Had CSI last visit in May, had about 2 months relief and would like to repeat today. Patient also c/o increased right shoulder pain as well over the past month. Issues with ROM and internal rotation on the right.   5/31/2024: Patient is here for follow up on left shoulder. Decreased pain w/rest. Pain when lifting. Tramadol helps pain. Pt is having pain and swelling in his right ankle.  02/28/2024: Patient is here today to follow up on his left shoulder. He reports that he has made improvement, but he still feels pain with certain movements, and he still feels discomfort when lying on his left side. He is going to PT 2x a week.   12/1/23: here to follow up left shoulder and review left shoulder mri results.   11/10/23: 60 year old male is here for an evaluation for the L shoulder. No injury occurred. Patient states he has been having pain in the L shoulder over a year ago. Patient saw  for the shoulder, got an xray done where the report stated the bone was getting close to the nerve. Patient was going to physical therapy, states PT helped with some relief, but it conflicted with his knee surgery, so he discontinued PT. Patient states pain has increased since then. Minimal relief with voltaren gel

## 2025-06-30 NOTE — PHYSICAL EXAM
[de-identified] : LEFT SHOULDER Inspection: well healed surg scars Palpation: No Tenderness is noted  Range of motion: 150/45/L5 Strength: 4+/5 limited by pain Neurological testing: motor and sensor intact distally. Ligament Stability and Special Tests:  Shoulder apprehension:  Shoulder relocation:  Obriens test: Biceps Active test: Foreman Labral Shear:  Impingement testing:  Thaddeus testing: Cross Body Adduction:

## 2025-06-30 NOTE — DATA REVIEWED
[MRI] : MRI [Left] : left [Shoulder] : shoulder [Report was reviewed and noted in the chart] : The report was reviewed and noted in the chart [I independently reviewed and interpreted images and report] : I independently reviewed and interpreted images and report [I reviewed the films/CD] : I reviewed the films/CD [FreeTextEntry1] : severe capsulitis with multiple synovial plica no evidence of recurrent RTC tear; no muscle atrophy AC joint arthrosis

## 2025-06-30 NOTE — PROCEDURE
[FreeTextEntry3] : Patient Identification Name/: Verbal with patient and/or family   Procedure Verification: Procedure confirmed with patient or family/designee Consent for procedure: Verbal Consent Given Relevant documentation completed, reviewed, and signed Clinical indications for procedure confirmed   Time-out with all members of procedure team immediately prior to procedure: Correct patient identified. Agreement on procedure. Correct side and site.   ULTRASOUND GUIDED SHOULDER GLENOHUMERAL JOINT INJECTION - LEFT After verbal consent and identification of the correct patient and correct site, the anterior LEFT shoulder was prepped using alcohol. This was allowed time to air dry. After ethyl choride spray for skin anesthesia, a mixture of 1cc Celestone 6mg/ml, 3cc Lidocaine 1%, and 3cc Bupivacaine 0.5% was injected under ultrasound guidance into the GLENOHUMERAL JOINT from anterior using a sterile 22G needle. Visualization of the needle and placement of the injection was performed without any complications. Ultrasound was used for visualization, precise injection in area of tear, and / or prior failure or difficult injection. The patient tolerated the procedure well. After-care instructions were provided and included instructions to ice the area and to call if redness, pain, or fever develop.

## 2025-06-30 NOTE — HISTORY OF PRESENT ILLNESS
[de-identified] : 06/02/2025: Pt here today for follow-up s/p left shoulder biceps tenotomy, RTC and labral debridement, and SAD on 2/11/2025. Pain and symptoms are worsening. Since last visit pt has been out of PT. He continues to take tramadol w/ little relief. Pt does states MDP was helpful w/ pain relief, but only lasted about a week. Today c/o persistent pain w/ lifting.  04/28/2025: Patient present today for POV #3 s/p left shoulder biceps tenotomy, RTC and labral debridement, and SAD DOS: 2/11/2025 Quality Of Symptoms: aching anterior should pain, intermittent sharp  Since last visit: Pt states about 10 days ago was his last PT session, while waiting for his script renewal he was doing HEP, but now c/o worsening anterior shoulder pain. He is suppose to return to formal PT wednesday of this week but present for eval today due to concern. He has been taking tramadol requesting renewal   03/20/2025 : patient present today for POV #2 s/p left shoulder biceps tenotomy, RTC and labral debridement, and SAD DOS: 2/11/2025 Pain:     At Rest: 7-8/10 With Activity: 7-8/10 Quality Of Symptoms: Aching pain, throbbing pain Since last visit: Patient has been doing PT 2x/wk @ [facility] in [place]. States he wakes up with a lot of pain, sometimes the pain is so bad it will wake him up in the middle of the night. States he takes the Tramodol for the pain with great relief.   02/20/2025 : patient present today for POV #1 s/p left shoulder biceps tenotomy, RTC and labral debridement, and SAD DOS: 2/11/2025 Pain:     At Rest: 7-8/10 With Activity: 2-3/10 Quality Of Symptoms: aching pain Since last visit: Pt is doing well postoperatively, denies fever/chills/nausea/vomiting. He has been compliant w/ sling. Pt still c/o pain w/ sleeping.   12/13/2024: Patient present today for follow-up left shoulder. Underwent MRI and is here to discuss the imaging results. Since last visit, pt has been feeling about the same.   11/08/2024: Pt here today for follow-up bilateral shoulders, L>R. Pain and symptoms are worsening. Since last visit pt has had about 1 week relief from CSI. C/o worsening pain after he turned to reach for something and felt his shoulder lock, about 3 weeks ago. He is concerned he has made his injury worse. He has been going to PT.  8/30/24: Follow up on the left shoulder today. Has been having some increased pain over the past couple weeks. Had CSI last visit in May, had about 2 months relief and would like to repeat today. Patient also c/o increased right shoulder pain as well over the past month. Issues with ROM and internal rotation on the right.   5/31/2024: Patient is here for follow up on left shoulder. Decreased pain w/rest. Pain when lifting. Tramadol helps pain. Pt is having pain and swelling in his right ankle.  02/28/2024: Patient is here today to follow up on his left shoulder. He reports that he has made improvement, but he still feels pain with certain movements, and he still feels discomfort when lying on his left side. He is going to PT 2x a week.   12/1/23: here to follow up left shoulder and review left shoulder mri results.   11/10/23: 60 year old male is here for an evaluation for the L shoulder. No injury occurred. Patient states he has been having pain in the L shoulder over a year ago. Patient saw  for the shoulder, got an xray done where the report stated the bone was getting close to the nerve. Patient was going to physical therapy, states PT helped with some relief, but it conflicted with his knee surgery, so he discontinued PT. Patient states pain has increased since then. Minimal relief with voltaren gel

## 2025-06-30 NOTE — PHYSICAL EXAM
[de-identified] : LEFT SHOULDER Inspection: well healed surg scars Palpation: No Tenderness is noted  Range of motion: 150/45/L5 Strength: 4+/5 limited by pain Neurological testing: motor and sensor intact distally. Ligament Stability and Special Tests:  Shoulder apprehension:  Shoulder relocation:  Obriens test: Biceps Active test: Foreman Labral Shear:  Impingement testing:  Thaddeus testing: Cross Body Adduction:

## 2025-06-30 NOTE — DISCUSSION/SUMMARY
[de-identified] : Patient is a 62 year y/o male about 4 months S/p left shoulder biceps tenotomy, RTC and labral debridement, and SAD DOS: 2/11/2025   The patient's post-op plan, protocol and activity modifications have been thoroughly discussed and the patient expressed understanding. The patient will control pain as discussed & continue ice and elevation as needed. The patient otherwise may advance activity as discussed.  - Reviewed and discussed MRI results with the patient. - We discussed their diagnosis and treatment options. - We will continue conservative treatment with activity modification, icing, weight loss, and anti-inflammatory medications. - The risks, benefits, and alternatives to corticosteroid injections were reviewed with the patient. Risks outlined include but are not limited to infection, sepsis, bleeding, scarring, skin discoloration, temporary increase in pain, syncopal episode, failure to resolve symptoms, symptoms recurrence, allergic reaction, flare reaction, and elevation of blood sugar in diabetics. Patient understood the risks and asked to proceed with this treatment course. Tolerated well - The patient was provided with a prescription for Physical Therapy. - The patient was advised to let pain guide the gradual advancement of activities. -  The patient was advised to apply ice (wrapped in a towel or protective covering) to the area daily - 20 minutes at a time, 2-4x/day.  Follow up: 3 months

## 2025-06-30 NOTE — DISCUSSION/SUMMARY
[de-identified] : Patient is a 62 year y/o male about 4 months S/p left shoulder biceps tenotomy, RTC and labral debridement, and SAD DOS: 2/11/2025   The patient's post-op plan, protocol and activity modifications have been thoroughly discussed and the patient expressed understanding. The patient will control pain as discussed & continue ice and elevation as needed. The patient otherwise may advance activity as discussed.  - Reviewed and discussed MRI results with the patient. - We discussed their diagnosis and treatment options. - We will continue conservative treatment with activity modification, icing, weight loss, and anti-inflammatory medications. - The risks, benefits, and alternatives to corticosteroid injections were reviewed with the patient. Risks outlined include but are not limited to infection, sepsis, bleeding, scarring, skin discoloration, temporary increase in pain, syncopal episode, failure to resolve symptoms, symptoms recurrence, allergic reaction, flare reaction, and elevation of blood sugar in diabetics. Patient understood the risks and asked to proceed with this treatment course. Tolerated well - The patient was provided with a prescription for Physical Therapy. - The patient was advised to let pain guide the gradual advancement of activities. -  The patient was advised to apply ice (wrapped in a towel or protective covering) to the area daily - 20 minutes at a time, 2-4x/day.  Follow up: 3 months

## 2025-06-30 NOTE — HISTORY OF PRESENT ILLNESS
[de-identified] : 06/02/2025: Pt here today for follow-up s/p left shoulder biceps tenotomy, RTC and labral debridement, and SAD on 2/11/2025. Pain and symptoms are worsening. Since last visit pt has been out of PT. He continues to take tramadol w/ little relief. Pt does states MDP was helpful w/ pain relief, but only lasted about a week. Today c/o persistent pain w/ lifting.  04/28/2025: Patient present today for POV #3 s/p left shoulder biceps tenotomy, RTC and labral debridement, and SAD DOS: 2/11/2025 Quality Of Symptoms: aching anterior should pain, intermittent sharp  Since last visit: Pt states about 10 days ago was his last PT session, while waiting for his script renewal he was doing HEP, but now c/o worsening anterior shoulder pain. He is suppose to return to formal PT wednesday of this week but present for eval today due to concern. He has been taking tramadol requesting renewal   03/20/2025 : patient present today for POV #2 s/p left shoulder biceps tenotomy, RTC and labral debridement, and SAD DOS: 2/11/2025 Pain:     At Rest: 7-8/10 With Activity: 7-8/10 Quality Of Symptoms: Aching pain, throbbing pain Since last visit: Patient has been doing PT 2x/wk @ [facility] in [place]. States he wakes up with a lot of pain, sometimes the pain is so bad it will wake him up in the middle of the night. States he takes the Tramodol for the pain with great relief.   02/20/2025 : patient present today for POV #1 s/p left shoulder biceps tenotomy, RTC and labral debridement, and SAD DOS: 2/11/2025 Pain:     At Rest: 7-8/10 With Activity: 2-3/10 Quality Of Symptoms: aching pain Since last visit: Pt is doing well postoperatively, denies fever/chills/nausea/vomiting. He has been compliant w/ sling. Pt still c/o pain w/ sleeping.   12/13/2024: Patient present today for follow-up left shoulder. Underwent MRI and is here to discuss the imaging results. Since last visit, pt has been feeling about the same.   11/08/2024: Pt here today for follow-up bilateral shoulders, L>R. Pain and symptoms are worsening. Since last visit pt has had about 1 week relief from CSI. C/o worsening pain after he turned to reach for something and felt his shoulder lock, about 3 weeks ago. He is concerned he has made his injury worse. He has been going to PT.  8/30/24: Follow up on the left shoulder today. Has been having some increased pain over the past couple weeks. Had CSI last visit in May, had about 2 months relief and would like to repeat today. Patient also c/o increased right shoulder pain as well over the past month. Issues with ROM and internal rotation on the right.   5/31/2024: Patient is here for follow up on left shoulder. Decreased pain w/rest. Pain when lifting. Tramadol helps pain. Pt is having pain and swelling in his right ankle.  02/28/2024: Patient is here today to follow up on his left shoulder. He reports that he has made improvement, but he still feels pain with certain movements, and he still feels discomfort when lying on his left side. He is going to PT 2x a week.   12/1/23: here to follow up left shoulder and review left shoulder mri results.   11/10/23: 60 year old male is here for an evaluation for the L shoulder. No injury occurred. Patient states he has been having pain in the L shoulder over a year ago. Patient saw  for the shoulder, got an xray done where the report stated the bone was getting close to the nerve. Patient was going to physical therapy, states PT helped with some relief, but it conflicted with his knee surgery, so he discontinued PT. Patient states pain has increased since then. Minimal relief with voltaren gel

## 2025-06-30 NOTE — DATA REVIEWED
[MRI] : MRI [Left] : left [Shoulder] : shoulder [Report was reviewed and noted in the chart] : The report was reviewed and noted in the chart [I independently reviewed and interpreted images and report] : I independently reviewed and interpreted images and report [I reviewed the films/CD] : I reviewed the films/CD [FreeTextEntry1] : severe capsulitis with multiple synovial plica no evidence of recurrent RTC tear; no muscle atrophy AC joint arthrosis No

## 2025-07-02 NOTE — PROCEDURE
[FreeTextEntry3] : Date of Service: 07/02/2025     Account: [id number]    Patient: HEIDI ALY     YOB: 1963    Age: 62 year    Surgeon:   Emmanuel Cho DO    Assistant: None    Pre-Operative Diagnosis:  Lumbar Radiculitis (M54.16)    Post Operative Diagnosis: Lumbarl Radiculitis (M54.16)    Procedure:             Left L4-5, L5-S1 transforaminal epidural steroid injection under fluoroscopic guidance.    Anesthesia:           MAC - Patient requested - Patient awake for injectate administration                      Procedure Detail: Patient was seen in the preoperative area and a detailed discussion about the risks (including potential neurovascular injury leading to paralysis and the differences between particulate and non-particulate steroid medications), benefits and alternatives was carried out.  All related questions were satisfactorily answered and an informed consent was signed.  Procedure site was marked and patient was taken to the fluoroscopy suite and placed in the prone position on the fluoroscopic table.  A spine positioning device was utilized for adequate position.  Monitors were applied and vital signs were recorded.  Anesthesia was carried out as mentioned above. A timeout was performed with all essential staff present and the site and side were verified.   The respective interspaces were located using fluoroscopic guidance.  The fluoroscopy beam was adjusted until the respective endplates were sharply aligned.  Oblique fluoroscopy projection was utilized so that the L4-5 and L5-1 foramens were visualized.  The target was the area was just inferior to the pedicle of the respective levels mentioned above (safe triangle). The area was prepped with Chloraprep in a circumferential manner and draped in sterile aseptic fashion.  The skin overlying the target point was infiltrated with 3-4 ml of 1% lidocaine using a 25 guage needle.   A 22-gauge spinal needle was placed inferior to the left L4 pedicle till it contacted os. The needle was slowly walked off under lateral fluoroscopy to position the needle tip in the posterior portion of the foramen. There was no cerebrospinal fluid or blood on aspiration.  Then, 0.5-1 ml of Omnipaque 240 mg/ml contrast agent was injected with visualization of nerve root sleeve on AP fluoroscopic view, and retrograde epidural flow.  At this point, a solution containing 1 ml of 0.9% preservative free normal saline with 7.5mg of dexamethasone was injected onto the nerve root sleeve and into the epidural space.   A 22-gauge spinal needle was placed inferior to the left L5 pedicle till it contacted os. The needle was slowly walked off under lateral fluoroscopy to position the needle tip in the posterior portion of the foramen. There was no cerebrospinal fluid or blood on aspiration.  Then, 0.5-1 ml of Omnipaque 240 mg/ml contrast agent was injected with visualization of nerve root sleeve on AP fluoroscopic view, and retrograde epidural flow.  At this point, a solution containing 1 ml of 0.9% preservative free normal saline with 7.5mg of dexamethasone was injected onto the nerve root sleeve and into the epidural space. Patient tolerated the procedure well.   There were no immediate complications from the performed procedure. Band aid(s) were applied to the injection site(s). The patient was instructed to apply ice over the injection sites for twenty minutes every two hours for the next 24 hours.    Disposition:         1. The patient was advised to F/U in 1-2 weeks to assess the response to the injection.       2. The patient was also instructed to contact me immediately if there were any concerns related to the procedure performed.

## 2025-07-16 NOTE — ASSESSMENT
[FreeTextEntry1] : 62 year M presents with back and leg pain.  Lumbar radiculitis, repeat TFESI as needed. Lumbar spondylosis, attempt MBBs.  A discussion regarding available pain management treatment options occurred with the patient.  These included interventional, rehabilitative, pharmacological, and alternative modalities. We will proceed with the following:   Interventional treatment options: - Proceed with BL L3-5 MBBs with fluoroscopic guidance - If inadequate relief would likely consider ILESI - see additional instructions below      Rehabilitative options: - participation in active HEP was discussed and instructions provided   Medication based treatment options: - None   Complementary treatment options: - Weight management and lifestyle modifications discussed    Additional treatment recommendations as follows: - patient to follow up with Dr. Mistry - [facility] after MBBs   The above documented care represents medical care services that are part of ongoing care related to this patient's serious or complex condition. Patient is presenting with acute/sub-acute pain with impairment in ADLs and functionality. The pain has not responded to at least 6 weeks of conservative care including NSAID therapy, OTC analgesics and/or physical therapy and/or home exercise program within the last 3 months.  The risks, benefits and alternatives of the proposed procedure were explained in detail with the patient. The risks outlined include but are not limited to infection, bleeding, post- dural puncture headache, nerve injury, a temporary increase in pain, failure to resolve symptoms, need for future interventions, allergic reaction, and possible elevation of blood sugar in diabetics if using corticosteroid.  All questions were answered to patient's apparent satisfaction, and he/she verbalized an understanding.  Patient presents with axial lumbar pain that has not responded to 3 months of conservative therapy including physical therapy or NSAID therapy.  The pain is interfering with activities of daily living and functionality.  There is no radicular pain.  The pain is exacerbated by facet loading.  Positive Kemps maneuver which is defined by pain reproduction with extension and rotation of the lumbar spine to the affected side.  The patient has not had a vertebral fusion at the levels of the proposed treatment.  There is no unexplained neurologic deficit.  There is no history of systemic infection, unstable medical condition, bleeding tendency, or local infection.  The injection is being performed to diagnose the facet joint as the source of the individual's pain, in preparation for a radiofrequency ablation.

## 2025-07-16 NOTE — HISTORY OF PRESENT ILLNESS
[Lower back] : lower back [3] : 3 [Sharp] : sharp [Constant] : constant [Leisure] : leisure [Sleep] : sleep [Social interactions] : social interactions [Meds] : meds [Ice] : ice [Heat] : heat [Walking] : walking [8] : 8 [Dull/Aching] : dull/aching [Stabbing] : stabbing [FreeTextEntry1] : 62 year M presents for follow up evaluation regarding their low back pain.   Last seen 07/02 for TFESI with about 100% relief of  radicular pain, now with axial pain.   Current treatment: Tramadol   Location: Bilateral lower back NO longer into into the left posterior thigh Numbness/tingling: Denies Weakness: Denies   Bowel/bladder dysfunction: Denies   Prior injections:  L L4-5, L5-S1 TFESI 07/02/25 100%   Prior Treatments: PT, Tramadol, Codeine, NSAIDs, Flexeril, Gabapentin   Pertinent Surgical History: Denies   Anticoagulation: Denies     Patient denies current infection, fevers, or chills. Physician Disclaimer: I have personally reviewed and confirmed all HPI data with the patient. [] : no [FreeTextEntry7] : left thigh sometimes  [de-identified] : waking up in the morning, turning

## 2025-07-16 NOTE — PHYSICAL EXAM
[de-identified] : General: Appears well nourished and well developed, no acute distress Musculoskeletal: Gait is non-antalgic, patient is ambulating without assistance Lumbar Spine Exam:                       Inspection:    erythema (-)   ecchymosis (-)   rashes (-)                         Palpation: Palpation/percussion at the midline lumbar levels reveals no tenderness                      ROM:    ROM - full range of motion with mild stiffness                           Strength Testin/5 in the bilateral lower extremities                      Reflexes: 2/2 in the bilateral lower extremities                      Sensation: Sensation to light touch grossly intact in the bilateral lower extremities                      Special Tests:  SLR: R (-) ; L (+), Facet loading: R (-) ; L (-)